# Patient Record
Sex: FEMALE | Race: WHITE | Employment: FULL TIME | URBAN - METROPOLITAN AREA
[De-identification: names, ages, dates, MRNs, and addresses within clinical notes are randomized per-mention and may not be internally consistent; named-entity substitution may affect disease eponyms.]

---

## 2019-02-07 ENCOUNTER — OFFICE VISIT (OUTPATIENT)
Dept: URGENT CARE | Facility: RETAIL CLINIC | Age: 59
End: 2019-02-07
Payer: COMMERCIAL

## 2019-02-07 VITALS
SYSTOLIC BLOOD PRESSURE: 123 MMHG | OXYGEN SATURATION: 99 % | DIASTOLIC BLOOD PRESSURE: 76 MMHG | HEART RATE: 85 BPM | TEMPERATURE: 99.9 F

## 2019-02-07 DIAGNOSIS — J02.9 ACUTE PHARYNGITIS, UNSPECIFIED ETIOLOGY: Primary | ICD-10-CM

## 2019-02-07 LAB — S PYO AG THROAT QL IA.RAPID: NORMAL

## 2019-02-07 PROCEDURE — 87880 STREP A ASSAY W/OPTIC: CPT | Mod: QW | Performed by: PHYSICIAN ASSISTANT

## 2019-02-07 PROCEDURE — 87081 CULTURE SCREEN ONLY: CPT | Performed by: PHYSICIAN ASSISTANT

## 2019-02-07 PROCEDURE — 99203 OFFICE O/P NEW LOW 30 MIN: CPT | Performed by: PHYSICIAN ASSISTANT

## 2019-02-07 RX ORDER — DIPHENOXYLATE HYDROCHLORIDE AND ATROPINE SULFATE 2.5; .025 MG/1; MG/1
1 TABLET ORAL
COMMUNITY
Start: 2016-06-15

## 2019-02-07 RX ORDER — LEVOTHYROXINE SODIUM 100 UG/1
100 TABLET ORAL
COMMUNITY
Start: 2018-06-25

## 2019-02-07 RX ORDER — AMPICILLIN TRIHYDRATE 500 MG
CAPSULE ORAL
COMMUNITY
Start: 2009-07-24

## 2019-02-07 NOTE — PROGRESS NOTES
Chief Complaint   Patient presents with     Cough     began yesterday     Throat Pain     began yesterday     Chills     1 day     Generalized Body Aches     1 day     SUBJECTIVE:  Felicia Sparks is a 58 year old female presenting with a chief complaint of a sore throat.  Onset of symptoms was 2 days ago.  Course of illness: sudden onset.  Severity: moderate  Current and Associated symptoms: chills, body aches and a little cough.  Treatment measures tried include: Tylenol/Ibuprofen.  Predisposing factors include: Teaches     No past medical history on file.  Current Outpatient Medications   Medication Sig Dispense Refill     Cholecalciferol (D 1000) 1000 units CAPS        levothyroxine (SYNTHROID/LEVOTHROID) 100 MCG tablet Take 100 mcg by mouth       Multiple Vitamin (MULTI-VITAMINS) TABS Take 1 tablet by mouth       Social History     Tobacco Use     Smoking status: Never Smoker   Substance Use Topics     Alcohol use: Not on file     Allergies   Allergen Reactions     Amoxicillin Hives     REVIEW OF SYSTEMS  General: POSITIVE for chills, body aches, headache.  Skin: Negative for rash.  ENT: POSITIVE for sore throat. NEGATIVE for ear pain.  Resp: POSITIVE for cough. NEGATIVE for wheezing and SOB.    OBJECTIVE:   /76 (BP Location: Left arm)   Pulse 85   Temp 99.9  F (37.7  C) (Oral)   SpO2 99%   GENERAL APPEARANCE: healthy, alert and in no distress  HEENT: Eyes PEERL, conjunctiva clear. Bilateral ear canals and TMs normal. Nose normal. Pharynx erythematous without tonsillar hypertrophy or exudate noted.  NECK: supple, non-tender to palpation, no adenopathy noted  RESP: lungs clear to auscultation - no rales, rhonchi or wheezes  CV: regular rates and rhythm, normal S1 S2, no murmur noted  SKIN: no suspicious lesions or rashes    Rapid Strep test is negative; await throat culture results.    ASSESSMENT:    ICD-10-CM    1. Acute pharyngitis, unspecified etiology J02.9 BETA STREP GROUP A R/O CULTURE     " RAPID STREP SCREEN     PLAN:   Patient Instructions   Rapid strep test today is negative.   Your throat culture is pending. [facility] will call if positive results to start antibiotics at that time; No call if the culture is negative.  Drink plenty of fluids and rest.  May use salt water gargles- about 8 oz warm water with about 1 teaspoon salt  Over the counter pain relievers such as Tylenol or ibuprofen may be used as needed.   Honey lemon tea helps to soothe the throat. \"Throat Coat\" tea is soothing as well.  Please follow up with primary care provider if not improving, worsening or new symptoms.    Follow up with primary care provider with any problems, questions or concerns or if symptoms worsen or fail to improve. Patient agreed to plan and verbalized understanding.    Ingris Mckeon PA-C  [facility] [place]  "

## 2019-02-08 NOTE — PATIENT INSTRUCTIONS
"Rapid strep test today is negative.   Your throat culture is pending. [facility] will call if positive results to start antibiotics at that time; No call if the culture is negative.  Drink plenty of fluids and rest.  May use salt water gargles- about 8 oz warm water with about 1 teaspoon salt  Over the counter pain relievers such as Tylenol or ibuprofen may be used as needed.   Honey lemon tea helps to soothe the throat. \"Throat Coat\" tea is soothing as well.  Please follow up with primary care provider if not improving, worsening or new symptoms.  "

## 2019-02-09 LAB
BACTERIA SPEC CULT: NORMAL
SPECIMEN SOURCE: NORMAL